# Patient Record
Sex: MALE | Race: WHITE | NOT HISPANIC OR LATINO | URBAN - METROPOLITAN AREA
[De-identification: names, ages, dates, MRNs, and addresses within clinical notes are randomized per-mention and may not be internally consistent; named-entity substitution may affect disease eponyms.]

---

## 2017-03-20 ENCOUNTER — COMMUNICATION - HEALTHEAST (OUTPATIENT)
Dept: INTERNAL MEDICINE | Facility: CLINIC | Age: 22
End: 2017-03-20

## 2017-03-22 ENCOUNTER — COMMUNICATION - HEALTHEAST (OUTPATIENT)
Dept: INTERNAL MEDICINE | Facility: CLINIC | Age: 22
End: 2017-03-22

## 2017-03-22 ENCOUNTER — COMMUNICATION - HEALTHEAST (OUTPATIENT)
Dept: TELEHEALTH | Facility: CLINIC | Age: 22
End: 2017-03-22

## 2017-03-22 ENCOUNTER — OFFICE VISIT - HEALTHEAST (OUTPATIENT)
Dept: INTERNAL MEDICINE | Facility: CLINIC | Age: 22
End: 2017-03-22

## 2017-03-22 DIAGNOSIS — F41.9 ANXIETY: ICD-10-CM

## 2017-03-22 DIAGNOSIS — F41.0 PANIC ATTACKS: ICD-10-CM

## 2017-03-23 ENCOUNTER — COMMUNICATION - HEALTHEAST (OUTPATIENT)
Dept: SCHEDULING | Facility: CLINIC | Age: 22
End: 2017-03-23

## 2017-04-14 ENCOUNTER — COMMUNICATION - HEALTHEAST (OUTPATIENT)
Dept: INTERNAL MEDICINE | Facility: CLINIC | Age: 22
End: 2017-04-14

## 2017-07-13 ENCOUNTER — COMMUNICATION - HEALTHEAST (OUTPATIENT)
Dept: INTERNAL MEDICINE | Facility: CLINIC | Age: 22
End: 2017-07-13

## 2017-07-19 ENCOUNTER — COMMUNICATION - HEALTHEAST (OUTPATIENT)
Dept: INTERNAL MEDICINE | Facility: CLINIC | Age: 22
End: 2017-07-19

## 2019-12-23 ENCOUNTER — RECORDS - HEALTHEAST (OUTPATIENT)
Dept: ADMINISTRATIVE | Facility: OTHER | Age: 24
End: 2019-12-23

## 2021-05-24 ENCOUNTER — RECORDS - HEALTHEAST (OUTPATIENT)
Dept: ADMINISTRATIVE | Facility: CLINIC | Age: 26
End: 2021-05-24

## 2021-05-25 ENCOUNTER — RECORDS - HEALTHEAST (OUTPATIENT)
Dept: ADMINISTRATIVE | Facility: CLINIC | Age: 26
End: 2021-05-25

## 2021-05-30 VITALS — BODY MASS INDEX: 18.11 KG/M2 | WEIGHT: 128 LBS

## 2021-06-02 ENCOUNTER — RECORDS - HEALTHEAST (OUTPATIENT)
Dept: ADMINISTRATIVE | Facility: CLINIC | Age: 26
End: 2021-06-02

## 2021-06-09 NOTE — PROGRESS NOTES
[facility] Note  Patient Name: Bassam Shah  Patient Age: 21 y.o.  YOB: 1995  MRN: [id number]  ?  Date of Visit: 3/22/2017  Reason for Office Visit:   Chief Complaint   Patient presents with     Panic Attack     x 1/yr       HPI: Bassam Shah 21 y.o. male who presents to clinic for anxiety. He has longstanding anxiety. He was prescribed paroxetine and xanax by Dr Berna john in April 2016. He was seeing Ivan Guerra at [facility] and working on CBT. He is currently at [facility], moved about 2 months, first time living away from home.     He has almost daily, triggers are when he is alone, and not around people or some sort of activity. He will feel chest pressure, warm, sometimes trouble breathing.     He was diagnosed with an eating disorder years ago, seems like more of a body dysmorphic disorder. Has enjoyed eating more lately and gaining weight. He was prescribed Remeron by a psychiatrist but never started the medication.     He does not smoke pot or use illicit drugs. Will drink alcohol on occasion, 1-2 drinks, does not think it is a problem in his life.     Does not consume excess caffeine       Review of Systems: As noted in HPI     Current Scheduled Meds:  Outpatient Encounter Prescriptions as of 3/22/2017   Medication Sig Dispense Refill     PARoxetine (PAXIL) 20 MG tablet 1/2 tablet at hs for 6 days then 1 at hs. 30 tablet 2     No facility-administered encounter medications on file as of 3/22/2017.        Objective / Physical Examination:  Visit Vitals     BP 90/56 (Patient Site: Right Arm, Patient Position: Sitting, Cuff Size: Adult Regular)     Pulse 72     Wt 128 lb (58.1 kg)     BMI 18.11 kg/m2     Wt Readings from Last 3 Encounters:   03/22/17 128 lb (58.1 kg)   05/16/16 120 lb 9.6 oz (54.7 kg)   04/20/16 120 lb 9.6 oz (54.7 kg)     Body mass index is 18.11 kg/(m^2). (>25?)    General Appearance: Alert and oriented in no acute  distress  Cardiovascular: RRR   Neuro: Alert and oriented, follows commands appropriately.  Psych: well groomed, speech coherent, anxious appearing, denies feeling sad/depressed, no hallucinations.MYNOR 7 score 17    Assessment / Plan / Medical Decision Making:      Encounter Diagnoses   Name Primary?     Anxiety Yes     Panic attacks         1. Anxiety/Panic disorder     Spoke for at lest 20 minutes with him and his father and also in confidentiality. He does not feel depressed or having any thoughts of harming himself or others. Given frequency of his panic disorder and underlying anxiety I encouraged him to give Paxil a shot for at least 3-4 weeks. I think this is a good choice given his underlying eating disorder and a lessor side effect of decreased appetite. I recommend he try and avoid alprazolam at this juncture and continue to work on MBSR techniques.     I refilled the prescription and encouraged him to establish care with a Provider in [place] to see if needed.     - PARoxetine (PAXIL) 20 MG tablet; 1/2 tablet at hs for 6 days then 1 at hs.  Dispense: 30 tablet; Refill: 2    - If tolerating medication may consider increasing to 40 mg/d    Follow up in 3-4 weeks to reassess     Total time spent with patient was 25 minutes with >50% of time spent in face-to-face counseling regarding the above plan     Ronald Jackson MD  [facility]